# Patient Record
Sex: FEMALE | URBAN - METROPOLITAN AREA
[De-identification: names, ages, dates, MRNs, and addresses within clinical notes are randomized per-mention and may not be internally consistent; named-entity substitution may affect disease eponyms.]

---

## 2023-11-30 ENCOUNTER — OFFICE VISIT (OUTPATIENT)
Dept: URBAN - METROPOLITAN AREA CLINIC 84 | Facility: CLINIC | Age: 48
End: 2023-11-30
Payer: COMMERCIAL

## 2023-11-30 ENCOUNTER — TELEPHONE ENCOUNTER (OUTPATIENT)
Dept: URBAN - METROPOLITAN AREA CLINIC 6 | Facility: CLINIC | Age: 48
End: 2023-11-30

## 2023-11-30 ENCOUNTER — LAB OUTSIDE AN ENCOUNTER (OUTPATIENT)
Dept: URBAN - METROPOLITAN AREA CLINIC 84 | Facility: CLINIC | Age: 48
End: 2023-11-30

## 2023-11-30 ENCOUNTER — DASHBOARD ENCOUNTERS (OUTPATIENT)
Age: 48
End: 2023-11-30

## 2023-11-30 VITALS
TEMPERATURE: 96.9 F | WEIGHT: 177.6 LBS | BODY MASS INDEX: 29.59 KG/M2 | SYSTOLIC BLOOD PRESSURE: 106 MMHG | HEIGHT: 65 IN | HEART RATE: 76 BPM | DIASTOLIC BLOOD PRESSURE: 76 MMHG

## 2023-11-30 DIAGNOSIS — K57.20 DIVERTICULITIS OF LARGE INTESTINE WITH PERFORATION AND ABSCESS WITHOUT BLEEDING: ICD-10-CM

## 2023-11-30 DIAGNOSIS — R10.32 LEFT LOWER QUADRANT ABDOMINAL PAIN: ICD-10-CM

## 2023-11-30 DIAGNOSIS — K76.9 HEPATIC LESION: ICD-10-CM

## 2023-11-30 PROBLEM — 300331000: Status: ACTIVE | Noted: 2023-11-30

## 2023-11-30 PROBLEM — 4494009: Status: ACTIVE | Noted: 2023-11-30

## 2023-11-30 PROCEDURE — 99204 OFFICE O/P NEW MOD 45 MIN: CPT | Performed by: INTERNAL MEDICINE

## 2023-11-30 RX ORDER — ERGOCALCIFEROL 1.25 MG/1
1 CAPSULE WEEKLY FOR 90 DAYS CAPSULE, LIQUID FILLED ORAL
Qty: 12 EACH | Refills: 0 | Status: ACTIVE | COMMUNITY

## 2023-11-30 RX ORDER — METFORMIN HCL 500 MG/1
TABLET ORAL
Qty: 180 TABLET | Status: ACTIVE | COMMUNITY

## 2023-11-30 NOTE — HPI-TODAY'S VISIT:
November 2023 visit: Patient was referred by general surgery dr mary patel for a follow-up of diverticulitis.. 1 st episode in aug 2023. Second bout of perforated diverticulitis in oct 2023 requiring abdominal drainage.  Admission in October 2023 for diverticulitis and sepsis.  CAT scan at that time confirmed colonic diverticulosis with perforated descending colon diverticulitis.  In addition a 2 x 2 x 3 cm irregular low-attenuation central hepatic lesion of unclear etiology.  no prior colonoscopy. No known history of colon cancer / GI malignancies / IBD in first degree family members. pain is improved but still present. IR removed drain on oct 29. regular bowels. no rectal bleeding.

## 2023-11-30 NOTE — PHYSICAL EXAM GASTROINTESTINAL
Abdomen , soft, moderate tenderness to deep palpation in LLQ, nondistended , no guarding or rigidity , no masses palpable

## 2024-08-08 ENCOUNTER — LAB OUTSIDE AN ENCOUNTER (OUTPATIENT)
Dept: URBAN - METROPOLITAN AREA CLINIC 84 | Facility: CLINIC | Age: 49
End: 2024-08-08

## 2024-08-08 ENCOUNTER — OFFICE VISIT (OUTPATIENT)
Dept: URBAN - METROPOLITAN AREA CLINIC 84 | Facility: CLINIC | Age: 49
End: 2024-08-08
Payer: COMMERCIAL

## 2024-08-08 VITALS
WEIGHT: 211.4 LBS | BODY MASS INDEX: 35.22 KG/M2 | HEART RATE: 69 BPM | DIASTOLIC BLOOD PRESSURE: 82 MMHG | HEIGHT: 65 IN | TEMPERATURE: 97.9 F | SYSTOLIC BLOOD PRESSURE: 129 MMHG

## 2024-08-08 DIAGNOSIS — Z12.11 COLON CANCER SCREENING: ICD-10-CM

## 2024-08-08 DIAGNOSIS — D18.03 LIVER HEMANGIOMA: ICD-10-CM

## 2024-08-08 DIAGNOSIS — K57.20 DIVERTICULITIS OF LARGE INTESTINE WITH PERFORATION AND ABSCESS WITHOUT BLEEDING: ICD-10-CM

## 2024-08-08 DIAGNOSIS — Z90.49 H/O LEFT HEMICOLECTOMY: ICD-10-CM

## 2024-08-08 PROBLEM — 428305005: Status: ACTIVE | Noted: 2024-08-08

## 2024-08-08 PROCEDURE — 99213 OFFICE O/P EST LOW 20 MIN: CPT | Performed by: INTERNAL MEDICINE

## 2024-08-08 RX ORDER — METFORMIN HCL 500 MG/1
TABLET ORAL
Qty: 180 TABLET | Status: ON HOLD | COMMUNITY

## 2024-08-08 RX ORDER — ERGOCALCIFEROL 1.25 MG/1
1 CAPSULE WEEKLY FOR 90 DAYS CAPSULE, LIQUID FILLED ORAL
Qty: 12 EACH | Refills: 0 | Status: ON HOLD | COMMUNITY

## 2024-08-08 NOTE — HPI-TODAY'S VISIT:
August 2024 visit: CAT scan abdomen pelvis with IV contrast was repeated in December 2023 which confirmed small intramural abscess in the distal descending colon, decreased 8.6 cm left anterior diverticular abscess with possible noninflamed fistulous communication to the distal descending colon, 2.7 cm left adnexal hemorrhagic cyst.   MRI in January 2024 confirmed a benign hepatic hemangioma/cyst with focal fatty infiltration of segment 4 of the liver.  Near complete resolution of the left abdominal abscess after drain placement.  s/p robotic left hemocolectomy in feb 2024. path confirmed divertticulitis.  no GI symptoms. no rectal bleeding.

## 2024-08-22 ENCOUNTER — CLAIMS CREATED FROM THE CLAIM WINDOW (OUTPATIENT)
Dept: URBAN - METROPOLITAN AREA CLINIC 4 | Facility: CLINIC | Age: 49
End: 2024-08-22
Payer: COMMERCIAL

## 2024-08-22 ENCOUNTER — CLAIMS CREATED FROM THE CLAIM WINDOW (OUTPATIENT)
Dept: URBAN - METROPOLITAN AREA SURGERY CENTER 20 | Facility: SURGERY CENTER | Age: 49
End: 2024-08-22
Payer: COMMERCIAL

## 2024-08-22 DIAGNOSIS — K51.40 INFLAMMATORY PSEUDOTUMOR OF COLON: ICD-10-CM

## 2024-08-22 DIAGNOSIS — D12.2 BENIGN NEOPLASM OF ASCENDING COLON: ICD-10-CM

## 2024-08-22 DIAGNOSIS — D12.0 BENIGN NEOPLASM OF CECUM: ICD-10-CM

## 2024-08-22 DIAGNOSIS — Z98.0 INTESTINAL BYPASS AND ANASTOMOSIS STATUS: ICD-10-CM

## 2024-08-22 DIAGNOSIS — D12.0 ADENOMA OF CECUM: ICD-10-CM

## 2024-08-22 DIAGNOSIS — Z12.11 ENCOUNTER SCREENING FOR MALIGNANT NEOPLASM OF COLON: ICD-10-CM

## 2024-08-22 DIAGNOSIS — K63.89 OTHER SPECIFIED DISEASES OF INTESTINE: ICD-10-CM

## 2024-08-22 DIAGNOSIS — K62.89 OTHER SPECIFIED DISEASES OF ANUS AND RECTUM: ICD-10-CM

## 2024-08-22 DIAGNOSIS — Z12.11 COLON CANCER SCREENING: ICD-10-CM

## 2024-08-22 DIAGNOSIS — Z12.11 ENCOUNTER FOR SCREENING FOR MALIGNANT NEOPLASM OF COLON: ICD-10-CM

## 2024-08-22 DIAGNOSIS — K62.1 OTHER POLYP OF RECTUM: ICD-10-CM

## 2024-08-22 DIAGNOSIS — K57.30 DIVERTICULOSIS OF COLON: ICD-10-CM

## 2024-08-22 DIAGNOSIS — D12.2 ADENOMA OF ASCENDING COLON: ICD-10-CM

## 2024-08-22 DIAGNOSIS — K51.40 INFLAMMATORY POLYPS OF COLON WITHOUT COMPLICATIONS: ICD-10-CM

## 2024-08-22 PROCEDURE — 88305 TISSUE EXAM BY PATHOLOGIST: CPT | Performed by: PATHOLOGY

## 2024-08-22 PROCEDURE — 45385 COLONOSCOPY W/LESION REMOVAL: CPT | Performed by: INTERNAL MEDICINE

## 2024-08-22 PROCEDURE — 45380 COLONOSCOPY AND BIOPSY: CPT | Performed by: INTERNAL MEDICINE

## 2024-08-22 PROCEDURE — 00812 ANES LWR INTST SCR COLSC: CPT | Performed by: NURSE ANESTHETIST, CERTIFIED REGISTERED

## 2024-08-22 RX ORDER — ERGOCALCIFEROL 1.25 MG/1
1 CAPSULE WEEKLY FOR 90 DAYS CAPSULE, LIQUID FILLED ORAL
Qty: 12 EACH | Refills: 0 | Status: ON HOLD | COMMUNITY

## 2024-08-22 RX ORDER — METFORMIN HCL 500 MG/1
TABLET ORAL
Qty: 180 TABLET | Status: ON HOLD | COMMUNITY